# Patient Record
Sex: FEMALE | Race: WHITE | NOT HISPANIC OR LATINO | URBAN - METROPOLITAN AREA
[De-identification: names, ages, dates, MRNs, and addresses within clinical notes are randomized per-mention and may not be internally consistent; named-entity substitution may affect disease eponyms.]

---

## 2018-11-21 ENCOUNTER — APPOINTMENT (RX ONLY)
Dept: URBAN - METROPOLITAN AREA OTHER 4 | Facility: OTHER | Age: 28
Setting detail: DERMATOLOGY
End: 2018-11-21

## 2018-11-21 DIAGNOSIS — L21.8 OTHER SEBORRHEIC DERMATITIS: ICD-10-CM

## 2018-11-21 DIAGNOSIS — L259 CONTACT DERMATITIS AND OTHER ECZEMA, UNSPECIFIED CAUSE: ICD-10-CM

## 2018-11-21 PROBLEM — L23.9 ALLERGIC CONTACT DERMATITIS, UNSPECIFIED CAUSE: Status: ACTIVE | Noted: 2018-11-21

## 2018-11-21 PROCEDURE — ? PRESCRIPTION

## 2018-11-21 PROCEDURE — ? COUNSELING

## 2018-11-21 PROCEDURE — 99213 OFFICE O/P EST LOW 20 MIN: CPT

## 2018-11-21 RX ORDER — KETOCONAZOLE 20.5 MG/ML
SHAMPOO, SUSPENSION TOPICAL
Qty: 1 | Refills: 1 | Status: ERX | COMMUNITY
Start: 2018-11-21

## 2018-11-21 RX ORDER — HYDROCORTISONE 25 MG/G
OINTMENT TOPICAL
Qty: 1 | Refills: 0 | Status: ERX | COMMUNITY
Start: 2018-11-21

## 2018-11-21 RX ORDER — PREDNISONE 10 MG/1
TABLET ORAL
Qty: 18 | Refills: 0 | Status: ERX | COMMUNITY
Start: 2018-11-21

## 2018-11-21 RX ADMIN — PREDNISONE: 10 TABLET ORAL at 16:11

## 2018-11-21 RX ADMIN — KETOCONAZOLE: 20.5 SHAMPOO, SUSPENSION TOPICAL at 16:10

## 2018-11-21 RX ADMIN — HYDROCORTISONE: 25 OINTMENT TOPICAL at 16:10

## 2018-11-21 ASSESSMENT — SEVERITY ASSESSMENT
HOW SEVERE IS THIS PATIENT'S CONDITION?: MODERATE
SEVERITY: MILD TO MODERATE

## 2018-11-21 ASSESSMENT — LOCATION SIMPLE DESCRIPTION DERM
LOCATION SIMPLE: LEFT CHEEK
LOCATION SIMPLE: RIGHT CHEEK
LOCATION SIMPLE: RIGHT LIP
LOCATION SIMPLE: SCALP

## 2018-11-21 ASSESSMENT — LOCATION ZONE DERM
LOCATION ZONE: FACE
LOCATION ZONE: LIP
LOCATION ZONE: SCALP

## 2018-11-21 ASSESSMENT — LOCATION DETAILED DESCRIPTION DERM
LOCATION DETAILED: RIGHT LOWER CUTANEOUS LIP
LOCATION DETAILED: LEFT INFERIOR CENTRAL MALAR CHEEK
LOCATION DETAILED: RIGHT SUPERIOR PARIETAL SCALP
LOCATION DETAILED: RIGHT INFERIOR CENTRAL MALAR CHEEK

## 2018-11-21 ASSESSMENT — PAIN INTENSITY VAS: HOW INTENSE IS YOUR PAIN 0 BEING NO PAIN, 10 BEING THE MOST SEVERE PAIN POSSIBLE?: NO PAIN

## 2019-12-26 ENCOUNTER — APPOINTMENT (RX ONLY)
Dept: URBAN - METROPOLITAN AREA OTHER 9 | Facility: OTHER | Age: 29
Setting detail: DERMATOLOGY
End: 2019-12-26

## 2019-12-26 DIAGNOSIS — B07.8 OTHER VIRAL WARTS: ICD-10-CM

## 2019-12-26 DIAGNOSIS — L90.5 SCAR CONDITIONS AND FIBROSIS OF SKIN: ICD-10-CM

## 2019-12-26 DIAGNOSIS — L81.4 OTHER MELANIN HYPERPIGMENTATION: ICD-10-CM

## 2019-12-26 PROCEDURE — ? BENIGN DESTRUCTION

## 2019-12-26 PROCEDURE — ? PATIENT SPECIFIC COUNSELING

## 2019-12-26 PROCEDURE — 17110 DESTRUCTION B9 LES UP TO 14: CPT

## 2019-12-26 PROCEDURE — ? COUNSELING

## 2019-12-26 PROCEDURE — ? PRESCRIPTION

## 2019-12-26 PROCEDURE — ? TREATMENT REGIMEN

## 2019-12-26 PROCEDURE — 99213 OFFICE O/P EST LOW 20 MIN: CPT | Mod: 25

## 2019-12-26 RX ORDER — TRETINOIN 0.5 MG/G
LOTION TOPICAL QD
Qty: 1 | Refills: 0 | Status: ERX | COMMUNITY
Start: 2019-12-26

## 2019-12-26 RX ADMIN — TRETINOIN: 0.5 LOTION TOPICAL at 00:00

## 2019-12-26 ASSESSMENT — LOCATION SIMPLE DESCRIPTION DERM
LOCATION SIMPLE: LEFT CHEEK
LOCATION SIMPLE: RIGHT CHEEK
LOCATION SIMPLE: RIGHT INDEX FINGER

## 2019-12-26 ASSESSMENT — LOCATION DETAILED DESCRIPTION DERM
LOCATION DETAILED: RIGHT INDEX FINGERTIP
LOCATION DETAILED: LEFT INFERIOR CENTRAL MALAR CHEEK
LOCATION DETAILED: RIGHT INFERIOR CENTRAL MALAR CHEEK

## 2019-12-26 ASSESSMENT — LOCATION ZONE DERM
LOCATION ZONE: FINGER
LOCATION ZONE: FACE

## 2019-12-26 NOTE — PROCEDURE: PATIENT SPECIFIC COUNSELING
Detail Level: Simple
Holden instructed the patient to follow up in 1 month but patient stated she will be back in [place]. Due to distance Holden instructed the patient to keep a close eye on area for fragments and if able to feel free to come in for that 1 month follow up for further evaluation.

## 2019-12-26 NOTE — PROCEDURE: BENIGN DESTRUCTION
Medical Necessity Clause: This procedure was medically necessary because the lesions that were treated were:
Add 52 Modifier (Optional): no
Post-Care Instructions: I reviewed with the patient in detail post-care instructions. Patient is to wear sunprotection, and avoid picking at any of the treated lesions. Pt may apply Vaseline to crusted or scabbing areas.
Medical Necessity Information: It is in your best interest to select a reason for this procedure from the list below. All of these items fulfill various CMS LCD requirements except the new and changing color options.
Anesthesia Volume In Cc: 0.6
Render Post-Care Instructions In Note?: yes
Detail Level: Simple
Consent: The patient's consent was obtained including but not limited to risks of crusting, scabbing, blistering, scarring, darker or lighter pigmentary change, recurrence, incomplete removal and infection.
Treatment Number (Will Not Render If 0): 1

## 2020-12-28 ENCOUNTER — APPOINTMENT (RX ONLY)
Dept: URBAN - METROPOLITAN AREA CLINIC 12 | Facility: CLINIC | Age: 30
Setting detail: DERMATOLOGY
End: 2020-12-28

## 2020-12-28 DIAGNOSIS — Z41.9 ENCOUNTER FOR PROCEDURE FOR PURPOSES OTHER THAN REMEDYING HEALTH STATE, UNSPECIFIED: ICD-10-CM

## 2020-12-28 PROCEDURE — ? BOTOX

## 2020-12-28 NOTE — PROCEDURE: BOTOX
Map Statment: See attached map for complete details
Forehead Units: 5
Lot #: [id number]
Additional Area 1 Units: 0
Detail Level: Detailed
Administered By (Optional): BEA Link
Additional Area 1 Location: chin
Consent: Written consent was obtained prior to the procedure. Risks, benefits, expectations and alternatives were discussed including, but not limited to, infection, bleeding, lid/brow ptosis, bruising, swelling, diplopia, temporary effects, incomplete chemical denervation and dissatisfaction with the cosmetic outcome. No guarantee or warranty was given or implied regarding longevity of results.
Postcare Instructions: Patient instructed to not lie down for 4 hours and limit physical activity for 24 hours. Patient instructed not to travel by airplane for 48 hours.
Glabellar Complex Units: 15
Price Per Unit In $ (Use Numbers Only, No Text Please.): 12
Use Map Statement For Sites (Optional): No
Expiration Date (Month Year): 9/23
Periorbital Skin Units: 2.5
Bill Summary Price Listed Below, Or Bill Total Of Units X Price Per Unit?: Bill #Units x Price Per Unit
Summary Price $ (Use Numbers Only, No Text Please.): 270
Reconstitution Date: 12/28/20

## 2021-03-11 ENCOUNTER — APPOINTMENT (RX ONLY)
Dept: URBAN - METROPOLITAN AREA CLINIC 12 | Facility: CLINIC | Age: 31
Setting detail: DERMATOLOGY
End: 2021-03-11

## 2021-03-11 DIAGNOSIS — Z41.9 ENCOUNTER FOR PROCEDURE FOR PURPOSES OTHER THAN REMEDYING HEALTH STATE, UNSPECIFIED: ICD-10-CM

## 2021-03-11 PROCEDURE — ? BOTOX

## 2021-03-11 NOTE — PROCEDURE: BOTOX
Additional Area 1 Location: chin
Devin Units: 0
Bill Summary Price Listed Below, Or Bill Total Of Units X Price Per Unit?: Bill #Units x Price Per Unit
Postcare Instructions: Patient instructed to not lie down for 4 hours and limit physical activity for 24 hours. Patient instructed not to travel by airplane for 48 hours.
Dilution (U/0.1 Cc): 5
Use Map Statement For Sites (Optional): No
Administered By (Optional): BEA Link
Expiration Date (Month Year): 12/23
Periorbital Skin Units: 2.5
Map Statment: See attached map for complete details
Price Per Unit In $ (Use Numbers Only, No Text Please.): 14
Additional Area 2 Location: masseters
Detail Level: Detailed
Consent: Written consent was obtained prior to the procedure. Risks, benefits, expectations and alternatives were discussed including, but not limited to, infection, bleeding, lid/brow ptosis, bruising, swelling, diplopia, temporary effects, incomplete chemical denervation and dissatisfaction with the cosmetic outcome. No guarantee or warranty was given or implied regarding longevity of results.
Glabellar Complex Units: 20
Summary Price $ (Use Numbers Only, No Text Please.): 350
Lot #: [id number]
Reconstitution Date: 3/10/21

## 2021-05-06 ENCOUNTER — APPOINTMENT (RX ONLY)
Dept: URBAN - METROPOLITAN AREA CLINIC 12 | Facility: CLINIC | Age: 31
Setting detail: DERMATOLOGY
End: 2021-05-06

## 2021-05-06 DIAGNOSIS — Z41.9 ENCOUNTER FOR PROCEDURE FOR PURPOSES OTHER THAN REMEDYING HEALTH STATE, UNSPECIFIED: ICD-10-CM

## 2021-05-06 PROCEDURE — ? BOTOX

## 2021-05-06 NOTE — PROCEDURE: BOTOX
Lot #: [id number]
Perioral Units: 0
Expiration Date (Month Year): 7/23
Use Map Statement For Sites (Optional): No
Price Per Unit In $ (Use Numbers Only, No Text Please.): 14
Summary Price $ (Use Numbers Only, No Text Please.): 420
Bill Summary Price Listed Below, Or Bill Total Of Units X Price Per Unit?: Bill Summary Price Below
Detail Level: Detailed
Periorbital Skin Units: 5
Additional Area 2 Location: masseter
Reconstitution Date: 5/5/21
Additional Area 1 Location: chin
Map Statment: See attached map for complete details
Consent: Written consent was obtained prior to the procedure. Risks, benefits, expectations and alternatives were discussed including, but not limited to, infection, bleeding, lid/brow ptosis, bruising, swelling, diplopia, temporary effects, incomplete chemical denervation and dissatisfaction with the cosmetic outcome. No guarantee or warranty was given or implied regarding longevity of results.
Administered By (Optional): BEA Link
Show Price In Note?: yes
Forehead Units: 10
Glabellar Complex Units: 15
Postcare Instructions: Patient instructed to not lie down for 4 hours and limit physical activity for 24 hours. Patient instructed not to travel by airplane for 48 hours.

## 2021-06-29 ENCOUNTER — APPOINTMENT (RX ONLY)
Dept: URBAN - METROPOLITAN AREA CLINIC 12 | Facility: CLINIC | Age: 31
Setting detail: DERMATOLOGY
End: 2021-06-29

## 2021-06-29 DIAGNOSIS — Z41.9 ENCOUNTER FOR PROCEDURE FOR PURPOSES OTHER THAN REMEDYING HEALTH STATE, UNSPECIFIED: ICD-10-CM

## 2021-06-29 PROCEDURE — ? ADDITIONAL NOTES

## 2021-06-29 PROCEDURE — ? BOTOX

## 2021-06-29 PROCEDURE — ? CONSULTATION - FILLERS

## 2021-06-29 NOTE — PROCEDURE: ADDITIONAL NOTES
Render Risk Assessment In Note?: no
Additional Notes: Pt had previous  lips at [facility] in [place].. she thinks she got VOLBELLA and then vollure. Pt complains of lumps on upper lip and would like them dissolved.

## 2021-06-29 NOTE — PROCEDURE: CONSULTATION - FILLERS
Jawline Primary Filler Volume In Cc (Estimated): 0
Upper Lips Primary Filler Volume In Cc (Estimated): 0.5
Detail Level: Detailed
Send Procedure Quote As Charge: No
Lips Filler (Primary): Sylvester Ultra
Additional Area 1 Location: neck

## 2021-06-29 NOTE — PROCEDURE: BOTOX
Forehead Units: 5
Additional Area 2 Units: 0
Postcare Instructions: Patient instructed to not lie down for 4 hours and limit physical activity for 24 hours. Patient instructed not to travel by airplane for 48 hours.
Price Per Unit In $ (Use Numbers Only, No Text Please.): 14
Glabellar Complex Units: 2.5
Bill Summary Price Listed Below, Or Bill Total Of Units X Price Per Unit?: Bill Summary Price Below
Map Statment: See attached map for complete details
Expiration Date (Month Year): 9/23
Use Map Statement For Sites (Optional): No
Additional Area 2 Location: masseter
Additional Area 1 Location: chin
Consent: Written consent was obtained prior to the procedure. Risks, benefits, expectations and alternatives were discussed including, but not limited to, infection, bleeding, lid/brow ptosis, bruising, swelling, diplopia, temporary effects, incomplete chemical denervation and dissatisfaction with the cosmetic outcome. No guarantee or warranty was given or implied regarding longevity of results.
Administered By (Optional): BEA Link
Additional Area 3 Location: gummy smile
Detail Level: Detailed
Reconstitution Date: 6/22/21
Summary Price $ (Use Numbers Only, No Text Please.): 105
Show Price In Note?: yes
Lot #: [id number]

## 2021-07-27 ENCOUNTER — APPOINTMENT (RX ONLY)
Dept: URBAN - METROPOLITAN AREA CLINIC 12 | Facility: CLINIC | Age: 31
Setting detail: DERMATOLOGY
End: 2021-07-27

## 2021-07-27 DIAGNOSIS — Z41.9 ENCOUNTER FOR PROCEDURE FOR PURPOSES OTHER THAN REMEDYING HEALTH STATE, UNSPECIFIED: ICD-10-CM

## 2021-07-27 PROCEDURE — ? HYALURONIDASE INJECTION

## 2021-07-27 PROCEDURE — ? BOTOX

## 2021-07-27 ASSESSMENT — LOCATION DETAILED DESCRIPTION DERM
LOCATION DETAILED: LEFT SUPERIOR VERMILION LIP
LOCATION DETAILED: RIGHT SUPERIOR VERMILION LIP

## 2021-07-27 ASSESSMENT — LOCATION SIMPLE DESCRIPTION DERM
LOCATION SIMPLE: RIGHT LIP
LOCATION SIMPLE: LEFT LIP

## 2021-07-27 ASSESSMENT — LOCATION ZONE DERM: LOCATION ZONE: LIP

## 2021-07-27 NOTE — PROCEDURE: HYALURONIDASE INJECTION
Filler Previously Used (Optional): vollure
Total Volume (Ccs): 1
Notes: Pt had previously received filler from another injector in [place]. She complains of superficial bumps. She would like to have it dissolved.
Detail Level: Detailed
Consent: The risks of the procedure including pain, burning, contour defects and dimpling of the skin, and the need for multiple treatments were reviewed with the patient prior to the injection.
Lot # (Optional): [id number]
Hyaluronidase Preparation: hyaluronidase
Expiration Date (Optional): 4/21
Price (Use Numbers Only, No Special Characters Or $): 150

## 2021-07-27 NOTE — PROCEDURE: BOTOX
Expiration Date (Month Year): 10/23
Map Statment: See attached map for complete details
Nasal Root Units: 0
Consent: Written consent was obtained prior to the procedure. Risks, benefits, expectations and alternatives were discussed including, but not limited to, infection, bleeding, lid/brow ptosis, bruising, swelling, diplopia, temporary effects, incomplete chemical denervation and dissatisfaction with the cosmetic outcome. No guarantee or warranty was given or implied regarding longevity of results.
Detail Level: Detailed
Postcare Instructions: Patient instructed to not lie down for 4 hours and limit physical activity for 24 hours. Patient instructed not to travel by airplane for 48 hours.
Additional Area 2 Location: masseter
Lot #: [id number]
Additional Area 3 Location: gummy smile
Administered By (Optional): BEA Link
Periorbital Skin Units: 5
Reconstitution Date: 7/22/21
Show Price In Note?: yes
Use Map Statement For Sites (Optional): No
Price Per Unit In $ (Use Numbers Only, No Text Please.): 14
Bill Summary Price Listed Below, Or Bill Total Of Units X Price Per Unit?: Bill Summary Price Below
Glabellar Complex Units: 15
Summary Price $ (Use Numbers Only, No Text Please.): 350
Additional Area 1 Location: chin

## 2021-08-10 ENCOUNTER — APPOINTMENT (RX ONLY)
Dept: URBAN - METROPOLITAN AREA CLINIC 12 | Facility: CLINIC | Age: 31
Setting detail: DERMATOLOGY
End: 2021-08-10

## 2021-08-10 DIAGNOSIS — Z41.9 ENCOUNTER FOR PROCEDURE FOR PURPOSES OTHER THAN REMEDYING HEALTH STATE, UNSPECIFIED: ICD-10-CM

## 2021-08-10 PROCEDURE — ? CONSULTATION - FILLERS

## 2021-08-10 PROCEDURE — ? RESTYLANE KYSSE INJECTION

## 2021-08-10 NOTE — PROCEDURE: CONSULTATION - FILLERS
Additional Area 3 Secondary Filler Volume In Cc (Estimated): 0
Jawline Filler (Primary): Teosyal RHA 4
Detail Level: Detailed
Jawline Primary Filler Volume In Cc (Estimated): 1
Additional Area 1 Location: neck
Send Procedure Quote As Charge: No
Malar Filler (Primary): Juvederm Voluma XC
Malar Primary Filler Volume In Cc (Estimated): 2

## 2021-08-10 NOTE — PROCEDURE: RESTYLANE KYSSE INJECTION
Lateral Face Filler Volume In Cc: 0
Additional Anesthesia Volume In Cc: 6
Expiration Date (Month Year): 9/31/22
Include Cannula Information In Note?: No
Detail Level: Detailed
Number Of Syringes (Required For Inventory): 1
Anesthesia Volume In Cc: 0.5
Post-Care Instructions: Patient instructed to apply ice to reduce swelling.
Price (Use Numbers Only, No Special Characters Or $): 336
Procedural Text: The filler was administered to the treatment areas noted above.
Map Statement: See Attach Map for Complete Details
Anesthesia Type: 1% lidocaine with epinephrine
Filler: Restylane Kysse
Consent: Written consent obtained. Risks include but not limited to bruising, beading, irregular texture, ulceration, infection, allergic reaction, scar formation, incomplete augmentation, temporary nature, procedural pain.
Lot #: [id number]

## 2021-08-31 ENCOUNTER — APPOINTMENT (RX ONLY)
Dept: URBAN - METROPOLITAN AREA CLINIC 12 | Facility: CLINIC | Age: 31
Setting detail: DERMATOLOGY
End: 2021-08-31

## 2021-08-31 DIAGNOSIS — Z41.9 ENCOUNTER FOR PROCEDURE FOR PURPOSES OTHER THAN REMEDYING HEALTH STATE, UNSPECIFIED: ICD-10-CM

## 2021-08-31 PROCEDURE — ? RESTYLANE KYSSE INJECTION

## 2021-08-31 PROCEDURE — ? BOTOX

## 2021-08-31 NOTE — PROCEDURE: BOTOX
Additional Area 1 Units: 0
Reconstitution Date: 8/26/21
Lot #: [id number]
Show Price In Note?: yes
Forehead Units: 5
Summary Price $ (Use Numbers Only, No Text Please.): 03
Map Statment: See attached map for complete details
Detail Level: Detailed
Bill Summary Price Listed Below, Or Bill Total Of Units X Price Per Unit?: Bill Summary Price Below
Postcare Instructions: Patient instructed to not lie down for 4 hours and limit physical activity for 24 hours. Patient instructed not to travel by airplane for 48 hours.
Use Map Statement For Sites (Optional): No
Additional Area 1 Location: chin
Expiration Date (Month Year): 11/24
Consent: Written consent was obtained prior to the procedure. Risks, benefits, expectations and alternatives were discussed including, but not limited to, infection, bleeding, lid/brow ptosis, bruising, swelling, diplopia, temporary effects, incomplete chemical denervation and dissatisfaction with the cosmetic outcome. No guarantee or warranty was given or implied regarding longevity of results.
Price Per Unit In $ (Use Numbers Only, No Text Please.): 14
Additional Area 2 Location: masseter
Additional Area 3 Location: gummy smile
Administered By (Optional): BEA Link

## 2021-08-31 NOTE — PROCEDURE: RESTYLANE KYSSE INJECTION
Cheeks Filler Volume In Cc: 0
Lot #: [id number]
Anesthesia Volume In Cc: 0.5
Additional Anesthesia Volume In Cc: 6
Number Of Syringes (Required For Inventory): 1
Procedural Text: The filler was administered to the treatment areas noted above.
Post-Care Instructions: Patient instructed to apply ice to reduce swelling.
Map Statement: See Attach Map for Complete Details
Detail Level: Detailed
Anesthesia Type: 1% lidocaine with epinephrine
Consent: Written consent obtained. Risks include but not limited to bruising, beading, irregular texture, ulceration, infection, allergic reaction, scar formation, incomplete augmentation, temporary nature, procedural pain.
Include Cannula Information In Note?: No
Expiration Date (Month Year): 9/30/22
Filler: Restylane Kysse

## 2021-09-14 ENCOUNTER — APPOINTMENT (RX ONLY)
Dept: URBAN - METROPOLITAN AREA CLINIC 12 | Facility: CLINIC | Age: 31
Setting detail: DERMATOLOGY
End: 2021-09-14

## 2021-09-14 DIAGNOSIS — Z428 OTHER AFTERCARE INVOLVING THE USE OF PLASTIC SURGERY: ICD-10-CM

## 2021-09-14 DIAGNOSIS — Z41.9 ENCOUNTER FOR PROCEDURE FOR PURPOSES OTHER THAN REMEDYING HEALTH STATE, UNSPECIFIED: ICD-10-CM

## 2021-09-14 PROCEDURE — ? CONSULTATION - FILLERS

## 2021-09-14 PROCEDURE — ? BOTOX

## 2021-09-14 NOTE — PROCEDURE: BOTOX
Devin Units: 0
Consent: Written consent was obtained prior to the procedure. Risks, benefits, expectations and alternatives were discussed including, but not limited to, infection, bleeding, lid/brow ptosis, bruising, swelling, diplopia, temporary effects, incomplete chemical denervation and dissatisfaction with the cosmetic outcome. No guarantee or warranty was given or implied regarding longevity of results.
Postcare Instructions: Patient instructed to not lie down for 4 hours and limit physical activity for 24 hours. Patient instructed not to travel by airplane for 48 hours.
Use Map Statement For Sites (Optional): No
Lot #: [id number]
Bill Summary Price Listed Below, Or Bill Total Of Units X Price Per Unit?: Bill Summary Price Below
Additional Area 1 Location: chin
Price Per Unit In $ (Use Numbers Only, No Text Please.): 14
Additional Area 2 Location: masseter
Map Statment: See attached map for complete details
Additional Area 3 Units: 5
Additional Area 3 Location: gummy smile
Expiration Date (Month Year): 11/23
Administered By (Optional): BEA Link
Show Price In Note?: yes
Detail Level: Detailed

## 2021-09-14 NOTE — PROCEDURE: CONSULTATION - FILLERS
Jawline Secondary Filler Volume In Cc (Estimated): 0
Jawline Filler (Primary): Juvederm Voluma XC
Jawline Primary Filler Volume In Cc (Estimated): 1
Additional Area 1 Location: chin
Detail Level: Detailed
Send Procedure Quote As Charge: No

## 2021-09-29 ENCOUNTER — APPOINTMENT (RX ONLY)
Dept: URBAN - METROPOLITAN AREA CLINIC 12 | Facility: CLINIC | Age: 31
Setting detail: DERMATOLOGY
End: 2021-09-29

## 2021-09-29 DIAGNOSIS — Z41.9 ENCOUNTER FOR PROCEDURE FOR PURPOSES OTHER THAN REMEDYING HEALTH STATE, UNSPECIFIED: ICD-10-CM

## 2021-09-29 PROCEDURE — ? BOTOX

## 2021-09-29 NOTE — PROCEDURE: BOTOX
Use Map Statement For Sites (Optional): No
Forehead Units: 5
Show Price In Note?: yes
Summary Price $ (Use Numbers Only, No Text Please.): 325
Additional Area 1 Location: chin
Detail Level: Detailed
Additional Area 2 Units: 0
Reconstitution Date: 9/29/21
Map Statment: See attached map for complete details
Bill Summary Price Listed Below, Or Bill Total Of Units X Price Per Unit?: Bill Summary Price Below
Postcare Instructions: Patient instructed to not lie down for 4 hours and limit physical activity for 24 hours. Patient instructed not to travel by airplane for 48 hours.
Price Per Unit In $ (Use Numbers Only, No Text Please.): 14
Additional Area 2 Location: masseter
Additional Area 3 Units: 2.5
Periorbital Skin Units: 10
Consent: Written consent was obtained prior to the procedure. Risks, benefits, expectations and alternatives were discussed including, but not limited to, infection, bleeding, lid/brow ptosis, bruising, swelling, diplopia, temporary effects, incomplete chemical denervation and dissatisfaction with the cosmetic outcome. No guarantee or warranty was given or implied regarding longevity of results.
Additional Area 3 Location: gummy smile
Administered By (Optional): BEA Link
Expiration Date (Month Year): 11/23
Lot #: [id number]
Glabellar Complex Units: 15

## 2021-10-12 ENCOUNTER — APPOINTMENT (RX ONLY)
Dept: URBAN - METROPOLITAN AREA CLINIC 12 | Facility: CLINIC | Age: 31
Setting detail: DERMATOLOGY
End: 2021-10-12

## 2021-10-12 DIAGNOSIS — Z41.9 ENCOUNTER FOR PROCEDURE FOR PURPOSES OTHER THAN REMEDYING HEALTH STATE, UNSPECIFIED: ICD-10-CM

## 2021-10-12 PROCEDURE — ? JUVEDERM ULTRA PLUS INJECTION

## 2021-10-12 PROCEDURE — ? JUVEDERM VOLUMA XC INJECTION

## 2021-10-12 NOTE — PROCEDURE: JUVEDERM ULTRA PLUS INJECTION
Additional Anesthesia Volume In Cc: 6
Procedural Text: The filler was administered to the treatment areas noted above.
Expiration Date (Month Year): 6/8/22
Lateral Face Filler Volume In Cc: 0
Price (Use Numbers Only, No Special Characters Or $): 395
Additional Area 1 Location: lips
Anesthesia Volume In Cc: 0.5
Vermilion Lips Filler Volume In Cc: 0.2
Consent: Written consent obtained. Risks include but not limited to bruising, beading, irregular texture, ulceration, infection, allergic reaction, scar formation, incomplete augmentation, temporary nature, procedural pain.
Map Statment: See Attach Map for Complete Details
Include Cannula Information In Note?: No
Number Of Syringes (Required For Inventory): 1
Detail Level: Detailed
Post-Care Instructions: Patient instructed to apply ice to reduce swelling.
Lot #: [id number]
Additional Area 2 Location: chin
Anesthesia Type: 1% lidocaine with epinephrine
Filler: Juvederm Ultra Plus

## 2021-10-12 NOTE — PROCEDURE: JUVEDERM VOLUMA XC INJECTION
Marionette Lines Filler Volume In Cc: 0
Post-Care Instructions: Patient instructed to apply ice to reduce swelling.
Cheeks Filler Volume In Cc: 1
Price (Use Numbers Only, No Special Characters Or $): 1800
Lot #: [id number]
Map Statment: See Attach Map for Complete Details
Filler: Juvederm Voluma XC
Anesthesia Type: 1% lidocaine with epinephrine
Detail Level: Detailed
Procedural Text: The filler was administered to the treatment areas noted above.
Use Map Statement For Sites (Optional): No
Expiration Date (Month Year): 10/27/22
Additional Anesthesia Volume In Cc: 6
Consent: Written consent obtained. Risks include but not limited to bruising, beading, irregular texture, ulceration, infection, allergic reaction, scar formation, incomplete augmentation, temporary nature, procedural pain.
Additional Area 1 Location: chin
Number Of Syringes (Required For Inventory): 2
Anesthesia Volume In Cc: 0.5

## 2021-10-26 ENCOUNTER — APPOINTMENT (RX ONLY)
Dept: URBAN - METROPOLITAN AREA CLINIC 12 | Facility: CLINIC | Age: 31
Setting detail: DERMATOLOGY
End: 2021-10-26

## 2021-10-26 DIAGNOSIS — Z41.9 ENCOUNTER FOR PROCEDURE FOR PURPOSES OTHER THAN REMEDYING HEALTH STATE, UNSPECIFIED: ICD-10-CM

## 2021-10-26 DIAGNOSIS — Z428 OTHER AFTERCARE INVOLVING THE USE OF PLASTIC SURGERY: ICD-10-CM

## 2021-10-26 PROCEDURE — ? CONSULTATION - FILLERS

## 2021-10-26 NOTE — PROCEDURE: CONSULTATION - FILLERS
Marionette Lines Primary Filler Volume In Cc (Estimated): 0
Upper Lips Primary Filler Volume In Cc (Estimated): 0.5
Send Procedure Quote As Charge: No
Detail Level: Detailed
Additional Area 2 Location: chin
Additional Area 1 Location: neck
Lips Filler (Primary): Juvederm Volbella XC

## 2021-11-11 ENCOUNTER — APPOINTMENT (RX ONLY)
Dept: URBAN - METROPOLITAN AREA CLINIC 12 | Facility: CLINIC | Age: 31
Setting detail: DERMATOLOGY
End: 2021-11-11

## 2021-11-11 DIAGNOSIS — Z41.9 ENCOUNTER FOR PROCEDURE FOR PURPOSES OTHER THAN REMEDYING HEALTH STATE, UNSPECIFIED: ICD-10-CM

## 2021-11-11 PROCEDURE — ? JUVEDERM ULTRA PLUS INJECTION

## 2021-11-11 PROCEDURE — ? RESTYLANE KYSSE INJECTION

## 2021-11-11 NOTE — PROCEDURE: RESTYLANE KYSSE INJECTION
Post-Care Instructions: Patient instructed to apply ice to reduce swelling.
Mid Face Filler Volume In Cc: 0
Vermilion Lips Filler Volume In Cc: 0.5
Anesthesia Type: 1% lidocaine with epinephrine
Filler: Restylane Kysse
Additional Anesthesia Volume In Cc: 6
Procedural Text: The filler was administered to the treatment areas noted above.
Detail Level: Detailed
Use Map Statement For Sites (Optional): No
Number Of Syringes (Required For Inventory): 1
Map Statement: See Attach Map for Complete Details
Lot #: [id number]
Expiration Date (Month Year): 5/31/23
Consent: Written consent obtained. Risks include but not limited to bruising, beading, irregular texture, ulceration, infection, allergic reaction, scar formation, incomplete augmentation, temporary nature, procedural pain.
Price (Use Numbers Only, No Special Characters Or $): 494

## 2021-11-11 NOTE — PROCEDURE: JUVEDERM ULTRA PLUS INJECTION
Cheeks Filler Volume In Cc: 0
Procedural Text: The filler was administered to the treatment areas noted above.
Expiration Date (Month Year): 6/8/22
Post-Care Instructions: Patient instructed to apply ice to reduce swelling.
Number Of Syringes (Required For Inventory): 1
Vermilion Lips Filler Volume In Cc: 0.3
Additional Area 1 Location: lips
Use Map Statement For Sites (Optional): No
Anesthesia Volume In Cc: 0.5
Consent: Written consent obtained. Risks include but not limited to bruising, beading, irregular texture, ulceration, infection, allergic reaction, scar formation, incomplete augmentation, temporary nature, procedural pain.
Additional Anesthesia Volume In Cc: 6
Detail Level: Detailed
Lot #: [id number]
Additional Area 2 Location: chin
Map Statment: See Attach Map for Complete Details
Filler: Juvederm Ultra Plus
Anesthesia Type: 1% lidocaine with epinephrine

## 2021-12-01 ENCOUNTER — APPOINTMENT (RX ONLY)
Dept: URBAN - METROPOLITAN AREA CLINIC 12 | Facility: CLINIC | Age: 31
Setting detail: DERMATOLOGY
End: 2021-12-01

## 2021-12-01 DIAGNOSIS — Z41.9 ENCOUNTER FOR PROCEDURE FOR PURPOSES OTHER THAN REMEDYING HEALTH STATE, UNSPECIFIED: ICD-10-CM

## 2021-12-01 DIAGNOSIS — Z428 OTHER AFTERCARE INVOLVING THE USE OF PLASTIC SURGERY: ICD-10-CM

## 2021-12-01 PROCEDURE — ? BOTOX

## 2021-12-01 NOTE — PROCEDURE: BOTOX
Postcare Instructions: Patient instructed to not lie down for 4 hours and limit physical activity for 24 hours. Patient instructed not to travel by airplane for 48 hours.
Price Per Unit In $ (Use Numbers Only, No Text Please.): 14
Use Map Statement For Sites (Optional): No
Forehead Units: 10
Glabellar Complex Units: 15
Platsyma Units: 0
Additional Area 2 Location: masseter
Detail Level: Detailed
Bill Summary Price Listed Below, Or Bill Total Of Units X Price Per Unit?: Bill Summary Price Below
Map Statment: See attached map for complete details
Dilution (U/0.1 Cc): 5
Summary Price $ (Use Numbers Only, No Text Please.): 372.5
Administered By (Optional): BEA Link
Show Price In Note?: yes
Additional Area 1 Location: chin
Lot #: [id number]
Additional Area 3 Location: gummy smile
Consent: Written consent was obtained prior to the procedure. Risks, benefits, expectations and alternatives were discussed including, but not limited to, infection, bleeding, lid/brow ptosis, bruising, swelling, diplopia, temporary effects, incomplete chemical denervation and dissatisfaction with the cosmetic outcome. No guarantee or warranty was given or implied regarding longevity of results.

## 2021-12-15 ENCOUNTER — APPOINTMENT (RX ONLY)
Dept: URBAN - METROPOLITAN AREA CLINIC 12 | Facility: CLINIC | Age: 31
Setting detail: DERMATOLOGY
End: 2021-12-15

## 2021-12-15 DIAGNOSIS — Z42.8 ENCOUNTER FOR OTHER PLASTIC AND RECONSTRUCTIVE SURGERY FOLLOWING MEDICAL PROCEDURE OR HEALED INJURY: ICD-10-CM

## 2021-12-15 DIAGNOSIS — Z41.9 ENCOUNTER FOR PROCEDURE FOR PURPOSES OTHER THAN REMEDYING HEALTH STATE, UNSPECIFIED: ICD-10-CM

## 2021-12-15 PROCEDURE — ? BOTOX

## 2021-12-15 NOTE — PROCEDURE: BOTOX
Additional Area 2 Units: 0
Show Price In Note?: yes
Administered By (Optional): BEA Link
Detail Level: Detailed
Use Map Statement For Sites (Optional): No
Postcare Instructions: Patient instructed to not lie down for 4 hours and limit physical activity for 24 hours. Patient instructed not to travel by airplane for 48 hours.
Consent: Written consent was obtained prior to the procedure. Risks, benefits, expectations and alternatives were discussed including, but not limited to, infection, bleeding, lid/brow ptosis, bruising, swelling, diplopia, temporary effects, incomplete chemical denervation and dissatisfaction with the cosmetic outcome. No guarantee or warranty was given or implied regarding longevity of results.
Additional Area 1 Location: chin
Additional Area 3 Units: 7.5
Dilution (U/0.1 Cc): 5
Bill Summary Price Listed Below, Or Bill Total Of Units X Price Per Unit?: Bill Summary Price Below
Price Per Unit In $ (Use Numbers Only, No Text Please.): 14
Additional Area 2 Location: masseter
Expiration Date (Month Year): 03/2024
Map Statment: See attached map for complete details
Additional Area 3 Location: gummy smile
Summary Price $ (Use Numbers Only, No Text Please.): 90
Lot #: [id number]

## 2022-01-05 ENCOUNTER — APPOINTMENT (RX ONLY)
Dept: URBAN - METROPOLITAN AREA CLINIC 12 | Facility: CLINIC | Age: 32
Setting detail: DERMATOLOGY
End: 2022-01-05

## 2022-01-05 DIAGNOSIS — Z41.9 ENCOUNTER FOR PROCEDURE FOR PURPOSES OTHER THAN REMEDYING HEALTH STATE, UNSPECIFIED: ICD-10-CM

## 2022-01-05 PROCEDURE — ? BOTOX

## 2022-01-05 PROCEDURE — ? RESTYLANE KYSSE INJECTION

## 2022-01-05 NOTE — PROCEDURE: RESTYLANE KYSSE INJECTION
Anesthesia Volume In Cc: 0.5
Additional Anesthesia Volume In Cc: 6
Decollete Filler Volume In Cc: 0
Consent: Written consent obtained. Risks include but not limited to bruising, beading, irregular texture, ulceration, infection, allergic reaction, scar formation, incomplete augmentation, temporary nature, procedural pain.
Use Map Statement For Sites (Optional): No
Number Of Syringes (Required For Inventory): 1
Lot #: [id number]
Procedural Text: The filler was administered to the treatment areas noted above.
Post-Care Instructions: Patient instructed to apply ice to reduce swelling.
Map Statement: See Attach Map for Complete Details
Vermilion Lips Filler Volume In Cc: 0.2
Expiration Date (Month Year): 5/31/23
Detail Level: Detailed
Filler: Restylane Kysse

## 2022-01-05 NOTE — PROCEDURE: BOTOX
Glabellar Complex Units: 0
Map Statment: See attached map for complete details
Forehead Units: 0.5
Additional Area 3 Location: gummy smile
Additional Area 1 Location: chin
Lot #: [id number]
Price Per Unit In $ (Use Numbers Only, No Text Please.): 14
Expiration Date (Month Year): 3/24
Detail Level: Detailed
Use Map Statement For Sites (Optional): No
Postcare Instructions: Patient instructed to not lie down for 4 hours and limit physical activity for 24 hours. Patient instructed not to travel by airplane for 48 hours.
Consent: Written consent was obtained prior to the procedure. Risks, benefits, expectations and alternatives were discussed including, but not limited to, infection, bleeding, lid/brow ptosis, bruising, swelling, diplopia, temporary effects, incomplete chemical denervation and dissatisfaction with the cosmetic outcome. No guarantee or warranty was given or implied regarding longevity of results.
Dilution (U/0.1 Cc): 5
Show Price In Note?: yes
Bill Summary Price Listed Below, Or Bill Total Of Units X Price Per Unit?: Bill Summary Price Below
Administered By (Optional): BEA Link
Additional Area 2 Location: masseter/temples

## 2022-02-22 ENCOUNTER — APPOINTMENT (RX ONLY)
Dept: URBAN - METROPOLITAN AREA CLINIC 12 | Facility: CLINIC | Age: 32
Setting detail: DERMATOLOGY
End: 2022-02-22

## 2022-02-22 DIAGNOSIS — Z41.9 ENCOUNTER FOR PROCEDURE FOR PURPOSES OTHER THAN REMEDYING HEALTH STATE, UNSPECIFIED: ICD-10-CM

## 2022-02-22 PROCEDURE — ? CONSULTATION - FILLERS

## 2022-02-22 PROCEDURE — ? RESTYLANE KYSSE INJECTION

## 2022-02-22 PROCEDURE — ? BOTOX

## 2022-02-22 NOTE — PROCEDURE: RESTYLANE KYSSE INJECTION
Post-Care Instructions: Patient instructed to apply ice to reduce swelling.
Expiration Date (Month Year): 5/31/23
Lateral Face Filler Volume In Cc: 0
Additional Anesthesia Volume In Cc: 6
Number Of Syringes (Required For Inventory): 1
Map Statement: See Attach Map for Complete Details
Procedural Text: The filler was administered to the treatment areas noted above.
Anesthesia Volume In Cc: 0.5
Lot #: [id number]
Detail Level: Detailed
Consent: Written consent obtained. Risks include but not limited to bruising, beading, irregular texture, ulceration, infection, allergic reaction, scar formation, incomplete augmentation, temporary nature, procedural pain.
Use Map Statement For Sites (Optional): No
Vermilion Lips Filler Volume In Cc: 0.3
Filler: Restylane Kysse

## 2022-02-22 NOTE — PROCEDURE: BOTOX
Postcare Instructions: Patient instructed to not lie down for 4 hours and limit physical activity for 24 hours. Patient instructed not to travel by airplane for 48 hours.
Price Per Unit In $ (Use Numbers Only, No Text Please.): 14
Use Map Statement For Sites (Optional): No
Forehead Units: 10
Glabellar Complex Units: 15
Platsyma Units: 0
Additional Area 2 Location: masseter
Additional Area 3 Units: 7.5
Detail Level: Detailed
Expiration Date (Month Year): 4/24
Bill Summary Price Listed Below, Or Bill Total Of Units X Price Per Unit?: Bill Summary Price Below
Map Statment: See attached map for complete details
Dilution (U/0.1 Cc): 5
Summary Price $ (Use Numbers Only, No Text Please.): 595
Administered By (Optional): BEA Link
Show Price In Note?: yes
Additional Area 1 Location: chin
Lot #: [id number]
Additional Area 3 Location: gummy smile
Consent: Written consent was obtained prior to the procedure. Risks, benefits, expectations and alternatives were discussed including, but not limited to, infection, bleeding, lid/brow ptosis, bruising, swelling, diplopia, temporary effects, incomplete chemical denervation and dissatisfaction with the cosmetic outcome. No guarantee or warranty was given or implied regarding longevity of results.

## 2022-02-22 NOTE — PROCEDURE: CONSULTATION - FILLERS
Additional Area 4 Primary Filler Volume In Cc (Estimated): 0
Additional Area 2 Location: submentum
Send Procedure Quote As Charge: No
Jawline Filler (Primary): Juvederm Voluma XC
Additional Area 1 Location: neck
Jawline Primary Filler Volume In Cc (Estimated): 1
Detail Level: Detailed

## 2022-02-24 ENCOUNTER — APPOINTMENT (RX ONLY)
Dept: URBAN - METROPOLITAN AREA OTHER 9 | Facility: OTHER | Age: 32
Setting detail: DERMATOLOGY
End: 2022-02-24

## 2022-02-24 DIAGNOSIS — D22 MELANOCYTIC NEVI: ICD-10-CM

## 2022-02-24 DIAGNOSIS — D485 NEOPLASM OF UNCERTAIN BEHAVIOR OF SKIN: ICD-10-CM

## 2022-02-24 PROBLEM — D48.5 NEOPLASM OF UNCERTAIN BEHAVIOR OF SKIN: Status: ACTIVE | Noted: 2022-02-24

## 2022-02-24 PROBLEM — D22.5 MELANOCYTIC NEVI OF TRUNK: Status: ACTIVE | Noted: 2022-02-24

## 2022-02-24 PROCEDURE — ? COUNSELING

## 2022-02-24 PROCEDURE — 99212 OFFICE O/P EST SF 10 MIN: CPT | Mod: 25

## 2022-02-24 PROCEDURE — ? BIOPSY BY SHAVE METHOD

## 2022-02-24 ASSESSMENT — LOCATION DETAILED DESCRIPTION DERM
LOCATION DETAILED: LEFT SUPERIOR UPPER BACK
LOCATION DETAILED: INFERIOR THORACIC SPINE

## 2022-02-24 ASSESSMENT — LOCATION SIMPLE DESCRIPTION DERM
LOCATION SIMPLE: UPPER BACK
LOCATION SIMPLE: LEFT UPPER BACK

## 2022-02-24 ASSESSMENT — LOCATION ZONE DERM: LOCATION ZONE: TRUNK

## 2022-02-24 NOTE — PROCEDURE: BIOPSY BY SHAVE METHOD
Detail Level: Detailed
Depth Of Biopsy: dermis
Was A Bandage Applied: Yes
Size Of Lesion In Cm: 0.5
X Size Of Lesion In Cm: 1
Biopsy Type: H and E
Biopsy Method: Personna blade
Anesthesia Type: 1% lidocaine with 1:200,000 epinephrine
Anesthesia Volume In Cc: 3
Additional Anesthesia Volume In Cc (Will Not Render If 0): 0
Hemostasis: Aluminum Chloride and Thermocautery
Wound Care: Petrolatum
Dressing: bandage
Destruction After The Procedure: No
Type Of Destruction Used: Curettage
Curettage Text: The wound bed was treated with curettage after the biopsy was performed
Cryotherapy Text: The wound bed was treated with cryotherapy after the biopsy was performed.
Electrodesiccation Text: The wound bed was treated with electrodesiccation after the biopsy was performed.
Electrodesiccation And Curettage Text: The wound bed was treated with electrodesiccation and curettage after the biopsy was performed.
Silver Nitrate Text: The wound bed was treated with silver nitrate after the biopsy was performed.
Lab: [id number]
Lab Facility: [id number]
Consent: Written consent was obtained and risks were reviewed including but not limited to scarring, infection, bleeding, scabbing, incomplete removal, nerve damage and allergy to anesthesia.
Post-Care Instructions: I reviewed with the patient in detail post-care instructions. Patient is to keep the biopsy site dry overnight, and then apply bacitracin twice daily until healed. Patient may apply hydrogen peroxide soaks to remove any crusting.
Notification Instructions: Patient will be notified of biopsy results. However, patient instructed to call the office if not contacted within 2 weeks.
Billing Type: Third-Party Bill
Information: Selecting Yes will display possible errors in your note based on the variables you have selected. This validation is only offered as a suggestion for you. PLEASE NOTE THAT THE VALIDATION TEXT WILL BE REMOVED WHEN YOU FINALIZE YOUR NOTE. IF YOU WANT TO FAX A PRELIMINARY NOTE YOU WILL NEED TO TOGGLE THIS TO 'NO' IF YOU DO NOT WANT IT IN YOUR FAXED NOTE.

## 2022-03-28 ENCOUNTER — APPOINTMENT (RX ONLY)
Dept: URBAN - METROPOLITAN AREA CLINIC 12 | Facility: CLINIC | Age: 32
Setting detail: DERMATOLOGY
End: 2022-03-28

## 2022-03-28 ENCOUNTER — APPOINTMENT (RX ONLY)
Dept: URBAN - METROPOLITAN AREA OTHER 9 | Facility: OTHER | Age: 32
Setting detail: DERMATOLOGY
End: 2022-03-28

## 2022-03-28 DIAGNOSIS — Z41.9 ENCOUNTER FOR PROCEDURE FOR PURPOSES OTHER THAN REMEDYING HEALTH STATE, UNSPECIFIED: ICD-10-CM

## 2022-03-28 PROBLEM — C44.519 BASAL CELL CARCINOMA OF SKIN OF OTHER PART OF TRUNK: Status: ACTIVE | Noted: 2022-03-28

## 2022-03-28 PROCEDURE — 17262 DSTRJ MAL LES T/A/L 1.1-2.0: CPT

## 2022-03-28 PROCEDURE — ? BOTOX

## 2022-03-28 PROCEDURE — ? CURETTAGE AND DESTRUCTION

## 2022-03-28 NOTE — PROCEDURE: BOTOX
Map Statment: See attached map for complete details
Use Map Statement For Sites (Optional): No
Additional Area 1 Location: chin
Postcare Instructions: Patient instructed to not lie down for 4 hours and limit physical activity for 24 hours. Patient instructed not to travel by airplane for 48 hours.
Summary Price $ (Use Numbers Only, No Text Please.): 200
Price Per Unit In $ (Use Numbers Only, No Text Please.): 15
Bill Summary Price Listed Below, Or Bill Total Of Units X Price Per Unit?: Bill Summary Price Below
Devin Units: 0
Show Price In Note?: yes
Lot #: [id number]
Forehead Units: 10
Additional Area 3 Location: axillae
Expiration Date (Month Year): 5/24
Additional Area 2 Location: masseter
Dilution (U/0.1 Cc): 5
Consent: Written consent was obtained prior to the procedure. Risks, benefits, expectations and alternatives were discussed including, but not limited to, infection, bleeding, lid/brow ptosis, bruising, swelling, diplopia, temporary effects, incomplete chemical denervation and dissatisfaction with the cosmetic outcome. No guarantee or warranty was given or implied regarding longevity of results.
Administered By (Optional): BEA Link
Detail Level: Simple

## 2022-03-28 NOTE — PROCEDURE: CURETTAGE AND DESTRUCTION
Body Location Override (Optional - Billing Will Still Be Based On Selected Body Map Location If Applicable): inferior thoracic spine
Detail Level: Detailed
Number Of Curettages: 3
Size Of Lesion In Cm: 1
Size Of Lesion After Curettage: 1.3
Add Intralesional Injection: No
Anesthesia Type: 0.25% bupivacaine with 1:200,000 epinephrine
Cautery Type: electrodesiccation
What Was Performed First?: Curettage
Final Size Statement: The size of the lesion after curettage was
Additional Information: (Optional): The wound was cleaned, and a pressure dressing was applied with Mupirocin. The patient received detailed post-op instructions. The patient experienced a brief vasovagal response to the procedure.
Consent was obtained from the patient. The risks, benefits and alternatives to therapy were discussed in detail. Specifically, the risks of infection, scarring, bleeding, prolonged wound healing, nerve injury, incomplete removal, allergy to anesthesia and recurrence were addressed. Alternatives to ED&C, such as: surgical removal and XRT were also discussed.  Prior to the procedure, the treatment site was clearly identified and confirmed by the patient. All components of Universal Protocol/PAUSE Rule completed.
Post-Care Instructions: I reviewed with the patient in detail post-care instructions. Patient is to keep the area dry for 48 hours, and not to engage in any swimming until the area is healed. Should the patient develop any fevers, chills, bleeding, severe pain patient will contact the office immediately.
Bill As A Line Item Or As Units: Line Item

## 2022-04-27 ENCOUNTER — APPOINTMENT (RX ONLY)
Dept: URBAN - METROPOLITAN AREA CLINIC 12 | Facility: CLINIC | Age: 32
Setting detail: DERMATOLOGY
End: 2022-04-27

## 2022-04-27 DIAGNOSIS — Z41.9 ENCOUNTER FOR PROCEDURE FOR PURPOSES OTHER THAN REMEDYING HEALTH STATE, UNSPECIFIED: ICD-10-CM

## 2022-04-27 PROCEDURE — ? BOTOX

## 2022-04-27 NOTE — PROCEDURE: BOTOX
Additional Area 1 Location: chin
Glabellar Complex Units: 0
Price Per Unit In $ (Use Numbers Only, No Text Please.): 15
Lot #: [id number]
Forehead Units: 5
Administered By (Optional): BEA Link
Postcare Instructions: Patient instructed to not lie down for 4 hours and limit physical activity for 24 hours. Patient instructed not to travel by airplane for 48 hours.
Show Price In Note?: yes
Expiration Date (Month Year): 5/24
Additional Area 2 Location: masseter
Use Map Statement For Sites (Optional): No
Consent: Written consent was obtained prior to the procedure. Risks, benefits, expectations and alternatives were discussed including, but not limited to, infection, bleeding, lid/brow ptosis, bruising, swelling, diplopia, temporary effects, incomplete chemical denervation and dissatisfaction with the cosmetic outcome. No guarantee or warranty was given or implied regarding longevity of results.
Detail Level: Simple
Bill Summary Price Listed Below, Or Bill Total Of Units X Price Per Unit?: Bill Summary Price Below
Periorbital Skin Units: 12.5
Summary Price $ (Use Numbers Only, No Text Please.): 315
Map Statment: See attached map for complete details
Additional Area 3 Location: gummy smile

## 2022-07-05 ENCOUNTER — APPOINTMENT (RX ONLY)
Dept: URBAN - METROPOLITAN AREA CLINIC 12 | Facility: CLINIC | Age: 32
Setting detail: DERMATOLOGY
End: 2022-07-05

## 2022-07-05 DIAGNOSIS — Z41.9 ENCOUNTER FOR PROCEDURE FOR PURPOSES OTHER THAN REMEDYING HEALTH STATE, UNSPECIFIED: ICD-10-CM

## 2022-07-05 PROCEDURE — ? BOTOX

## 2022-07-05 PROCEDURE — ? CONSULTATION - FILLERS

## 2022-07-05 NOTE — PROCEDURE: CONSULTATION - FILLERS
Tear Troughs Secondary Filler Volume In Cc (Estimated): 0
Additional Area 4 Location: oral commissures
Detail Level: Detailed
Lips Filler (Primary): Amy Roldan
Upper Lips Primary Filler Volume In Cc (Estimated): 0.5
Additional Area 3 Location: buttocks
Additional Area 2 Location: pyriformis
Procedure Quote $ (Will Render In Note. Use Numbers Only, No Text Please.): 395
Send Procedure Quote As Charge: No
Additional Area 1 Location: chin
Additional Area 5 Location: dorsal hands

## 2022-07-05 NOTE — PROCEDURE: BOTOX
Use Map Statement For Sites (Optional): No
Additional Area 3 Location: platysma
Platsyma Units: 0
Additional Area 1 Units: 5
Summary Price $ (Use Numbers Only, No Text Please.): 770
Administered By (Optional): BEA Link
Bill Summary Price Listed Below, Or Bill Total Of Units X Price Per Unit?: Bill Summary Price Below
Show Price In Note?: yes
Detail Level: Simple
Periorbital Skin Units: 12.5
Postcare Instructions: Patient instructed to not lie down for 4 hours and limit physical activity for 24 hours. Patient instructed not to travel by airplane for 48 hours.
Additional Area 2 Units: 7.5
Additional Area 2 Location: gummy smile
Consent: Written consent was obtained prior to the procedure. Risks, benefits, expectations and alternatives were discussed including, but not limited to, infection, bleeding, lid/brow ptosis, bruising, swelling, diplopia, temporary effects, incomplete chemical denervation and dissatisfaction with the cosmetic outcome. No guarantee or warranty was given or implied regarding longevity of results.
Glabellar Complex Units: 20
Expiration Date (Month Year): 10/23
Forehead Units: 10
Map Statment: See attached map for complete details
Lot #: [id number]
Additional Area 1 Location: chin
Price Per Unit In $ (Use Numbers Only, No Text Please.): 15

## 2022-11-11 ENCOUNTER — APPOINTMENT (RX ONLY)
Dept: URBAN - METROPOLITAN AREA CLINIC 12 | Facility: CLINIC | Age: 32
Setting detail: DERMATOLOGY
End: 2022-11-11

## 2022-11-11 DIAGNOSIS — Z41.9 ENCOUNTER FOR PROCEDURE FOR PURPOSES OTHER THAN REMEDYING HEALTH STATE, UNSPECIFIED: ICD-10-CM

## 2022-11-11 PROCEDURE — ? BOTOX

## 2022-11-11 PROCEDURE — ? JUVEDERM ULTRA INJECTION

## 2022-11-11 NOTE — PROCEDURE: JUVEDERM ULTRA INJECTION
Number Of Syringes (Required For Inventory): 1
Temple Hollows Filler Volume In Cc: 0
Map Statement: See Attach Map for Complete Details
Price (Use Numbers Only, No Special Characters Or $): 395
Anesthesia Volume In Cc: 0.5
Procedural Text: The filler was administered to the treatment areas noted above.
Lot #: [id number]
Detail Level: Detailed
Additional Area 2 Location: chin
Anesthesia Type: 1% lidocaine with epinephrine
Filler: Juvederm Ultra
Use Map Statement For Sites (Optional): No
Consent: Written consent obtained. Risks include but not limited to bruising, beading, irregular texture, ulceration, infection, allergic reaction, scar formation, incomplete augmentation, temporary nature, procedural pain.
Additional Area 2 Volume In Cc: 0.1
Additional Anesthesia Volume In Cc: 6
Expiration Date (Month Year): 7/24
Post-Care Instructions: Patient instructed to apply ice to reduce swelling.
Vermilion Lips Filler Volume In Cc: 0.4
Additional Area 1 Location: perioral lines

## 2022-11-11 NOTE — PROCEDURE: BOTOX
Dilution (U/0.1 Cc): 5
Additional Area 2 Units: 0
Consent: Written consent was obtained prior to the procedure. Risks, benefits, expectations and alternatives were discussed including, but not limited to, infection, bleeding, lid/brow ptosis, bruising, swelling, diplopia, temporary effects, incomplete chemical denervation and dissatisfaction with the cosmetic outcome. No guarantee or warranty was given or implied regarding longevity of results.
Additional Area 1 Location: chin
Forehead Units: 2.5
Map Statment: See attached map for complete details
Expiration Date (Month Year): 8/24
Additional Area 3 Units: 7.5
Additional Area 2 Location: platysma
Use Map Statement For Sites (Optional): No
Additional Area 3 Location: gummy smile
Periorbital Skin Units: 10
Lot #: [id number]
Show Price In Note?: yes
Detail Level: Simple
Administered By (Optional): Jennifer Martinez PA-C
Bill Summary Price Listed Below, Or Bill Total Of Units X Price Per Unit?: Bill #Units x Price Per Unit
Postcare Instructions: Patient instructed to not lie down for 4 hours and limit physical activity for 24 hours. Patient instructed not to travel by airplane for 48 hours.
Price Per Unit In $ (Use Numbers Only, No Text Please.): 14

## 2022-12-28 ENCOUNTER — APPOINTMENT (RX ONLY)
Dept: URBAN - METROPOLITAN AREA CLINIC 12 | Facility: CLINIC | Age: 32
Setting detail: DERMATOLOGY
End: 2022-12-28

## 2022-12-28 ENCOUNTER — APPOINTMENT (RX ONLY)
Dept: URBAN - METROPOLITAN AREA OTHER 5 | Facility: OTHER | Age: 32
Setting detail: DERMATOLOGY
End: 2022-12-28

## 2022-12-28 DIAGNOSIS — D22 MELANOCYTIC NEVI: ICD-10-CM

## 2022-12-28 DIAGNOSIS — L90.5 SCAR CONDITIONS AND FIBROSIS OF SKIN: ICD-10-CM

## 2022-12-28 DIAGNOSIS — L57.8 OTHER SKIN CHANGES DUE TO CHRONIC EXPOSURE TO NONIONIZING RADIATION: ICD-10-CM

## 2022-12-28 DIAGNOSIS — Z41.9 ENCOUNTER FOR PROCEDURE FOR PURPOSES OTHER THAN REMEDYING HEALTH STATE, UNSPECIFIED: ICD-10-CM

## 2022-12-28 PROBLEM — D22.5 MELANOCYTIC NEVI OF TRUNK: Status: ACTIVE | Noted: 2022-12-28

## 2022-12-28 PROCEDURE — ? BOTOX

## 2022-12-28 PROCEDURE — ? COUNSELING

## 2022-12-28 PROCEDURE — ? JUVEDERM VOLUMA XC INJECTION

## 2022-12-28 PROCEDURE — 99212 OFFICE O/P EST SF 10 MIN: CPT

## 2022-12-28 PROCEDURE — ? ADDITIONAL NOTES

## 2022-12-28 ASSESSMENT — LOCATION SIMPLE DESCRIPTION DERM
LOCATION SIMPLE: LEFT FOREHEAD
LOCATION SIMPLE: LEFT UPPER BACK
LOCATION SIMPLE: RIGHT LOWER BACK

## 2022-12-28 ASSESSMENT — LOCATION ZONE DERM
LOCATION ZONE: FACE
LOCATION ZONE: TRUNK

## 2022-12-28 ASSESSMENT — LOCATION DETAILED DESCRIPTION DERM
LOCATION DETAILED: LEFT INFERIOR MEDIAL FOREHEAD
LOCATION DETAILED: LEFT SUPERIOR MEDIAL UPPER BACK
LOCATION DETAILED: RIGHT SUPERIOR MEDIAL MIDBACK

## 2022-12-28 NOTE — PROCEDURE: ADDITIONAL NOTES
Render Risk Assessment In Note?: no
[facility] [id number]

## 2022-12-28 NOTE — HPI: FULL BODY SKIN EXAMINATION
How Severe Are Your Spot(S)?: moderate
What Is The Reason For Today's Visit?: Full Body Skin Examination
What Is The Reason For Today's Visit? (Being Monitored For X): the re-examination of lesions previously examined
Additional History: recheck BCC site on back

## 2022-12-28 NOTE — PROCEDURE: BOTOX
Dilution (U/0.1 Cc): 5
Additional Area 2 Units: 0
Consent: Written consent was obtained prior to the procedure. Risks, benefits, expectations and alternatives were discussed including, but not limited to, infection, bleeding, lid/brow ptosis, bruising, swelling, diplopia, temporary effects, incomplete chemical denervation and dissatisfaction with the cosmetic outcome. No guarantee or warranty was given or implied regarding longevity of results.
Additional Area 1 Location: chin
Forehead Units: 7.5
Map Statment: See attached map for complete details
Expiration Date (Month Year): 2/25
Additional Area 2 Location: platysma
Use Map Statement For Sites (Optional): No
Additional Area 3 Location: gummy smile
Periorbital Skin Units: 10
Lot #: [id number]
Show Price In Note?: yes
Detail Level: Simple
Administered By (Optional): Jennifer Martinez PA-C
Bill Summary Price Listed Below, Or Bill Total Of Units X Price Per Unit?: Bill #Units x Price Per Unit
Postcare Instructions: Patient instructed to not lie down for 4 hours and limit physical activity for 24 hours. Patient instructed not to travel by airplane for 48 hours.
Glabellar Complex Units: 17.5
Price Per Unit In $ (Use Numbers Only, No Text Please.): 13
Additional Area 2 Location: masseters

## 2022-12-28 NOTE — PROCEDURE: JUVEDERM VOLUMA XC INJECTION
Detail Level: Detailed
Additional Area 1 Location: pyriformis
Temple Hollows Filler Volume In Cc: 0
Number Of Syringes (Required For Inventory): 1
Use Map Statement For Sites (Optional): No
Procedural Text: The filler was administered to the treatment areas noted above.
Anesthesia Volume In Cc: 0.5
Consent: Written consent obtained. Risks include but not limited to bruising, beading, irregular texture, ulceration, infection, allergic reaction, scar formation, incomplete augmentation, temporary nature, procedural pain.
Lot #: [id number]
Additional Area 2 Location: [place]ton
Post-Care Instructions: Patient instructed to apply ice to reduce swelling.
Filler: Juvederm Voluma XC
Anesthesia Type: 1% lidocaine with epinephrine
Price (Use Numbers Only, No Special Characters Or $): 170
Map Statment: See Attach Map for Complete Details
Additional Anesthesia Volume In Cc: 6
Expiration Date (Month Year): 11/23

## 2023-04-13 ENCOUNTER — APPOINTMENT (RX ONLY)
Dept: URBAN - METROPOLITAN AREA CLINIC 12 | Facility: CLINIC | Age: 33
Setting detail: DERMATOLOGY
End: 2023-04-13

## 2023-04-13 DIAGNOSIS — Z41.9 ENCOUNTER FOR PROCEDURE FOR PURPOSES OTHER THAN REMEDYING HEALTH STATE, UNSPECIFIED: ICD-10-CM

## 2023-04-13 PROCEDURE — ? BOTOX

## 2023-04-13 NOTE — PROCEDURE: BOTOX
Dilution (U/0.1 Cc): 5
Additional Area 2 Units: 0
Consent: Written consent was obtained prior to the procedure. Risks, benefits, expectations and alternatives were discussed including, but not limited to, infection, bleeding, lid/brow ptosis, bruising, swelling, diplopia, temporary effects, incomplete chemical denervation and dissatisfaction with the cosmetic outcome. No guarantee or warranty was given or implied regarding longevity of results.
Additional Area 1 Location: chin
Forehead Units: 7.5
Map Statment: See attached map for complete details
Expiration Date (Month Year): 2/25
Additional Area 2 Location: platysma
Use Map Statement For Sites (Optional): No
Additional Area 3 Location: gummy smile
Periorbital Skin Units: 10
Lot #: [id number]
Show Price In Note?: yes
Detail Level: Simple
Administered By (Optional): Jennifer Martinez PA-C
Bill Summary Price Listed Below, Or Bill Total Of Units X Price Per Unit?: Bill #Units x Price Per Unit
Postcare Instructions: Patient instructed to not lie down for 4 hours and limit physical activity for 24 hours. Patient instructed not to travel by airplane for 48 hours.
Glabellar Complex Units: 17.5
Price Per Unit In $ (Use Numbers Only, No Text Please.): 14

## 2023-04-17 ENCOUNTER — APPOINTMENT (RX ONLY)
Dept: URBAN - METROPOLITAN AREA CLINIC 12 | Facility: CLINIC | Age: 33
Setting detail: DERMATOLOGY
End: 2023-04-17

## 2023-04-17 DIAGNOSIS — Z41.9 ENCOUNTER FOR PROCEDURE FOR PURPOSES OTHER THAN REMEDYING HEALTH STATE, UNSPECIFIED: ICD-10-CM

## 2023-04-17 PROCEDURE — ? BOTOX

## 2023-04-17 PROCEDURE — ? RESTYLANE KYSSE INJECTION

## 2023-04-17 PROCEDURE — ? JUVEDERM VOLUMA XC INJECTION

## 2023-04-17 PROCEDURE — ? ADDITIONAL NOTES

## 2023-04-17 NOTE — PROCEDURE: ADDITIONAL NOTES
Render Risk Assessment In Note?: no
Additional Notes: Voluma 1cc [id number]\\nKYSSE 1cc [id number] 0.6 residual

## 2023-04-17 NOTE — PROCEDURE: RESTYLANE KYSSE INJECTION
Number Of Syringes (Required For Inventory): 1
Jawline Filler Volume In Cc: 0
Expiration Date (Month Year): 2/24
Include Cannula Information In Note?: No
Price (Use Numbers Only, No Special Characters Or $): 792
Anesthesia Volume In Cc: 0.5
Post-Care Instructions: Patient instructed to apply ice to reduce swelling.
Map Statement: See Attach Map for Complete Details
Lot #: [id number]
Filler: Restylane Kysse
Vermilion Lips Filler Volume In Cc: 0.4
Detail Level: Detailed
Procedural Text: The filler was administered to the treatment areas noted above.
Consent: Written consent obtained. Risks include but not limited to bruising, beading, irregular texture, ulceration, infection, allergic reaction, scar formation, incomplete augmentation, temporary nature, procedural pain.
Additional Anesthesia Volume In Cc: 6

## 2023-04-17 NOTE — PROCEDURE: JUVEDERM VOLUMA XC INJECTION
Price (Use Numbers Only, No Special Characters Or $): 844
Nasolabial Folds Filler Volume In Cc: 0
Additional Area 3 Location: chin
Map Statment: See Attach Map for Complete Details
Expiration Date (Month Year): 11/23
Additional Anesthesia Volume In Cc: 6
Additional Area 1 Location: pyriformis
Anesthesia Volume In Cc: 0.5
Number Of Syringes (Required For Inventory): 1
Procedural Text: The filler was administered to the treatment areas noted above.
Detail Level: Detailed
Include Cannula Information In Note?: No
Consent: Written consent obtained. Risks include but not limited to bruising, beading, irregular texture, ulceration, infection, allergic reaction, scar formation, incomplete augmentation, temporary nature, procedural pain.
Additional Area 1 Volume In Cc: 0.2
Lot #: [id number]
Post-Care Instructions: Patient instructed to apply ice to reduce swelling.
Jawline Filler Volume In Cc: 0.3
Anesthesia Type: 1% lidocaine with epinephrine
Filler: Juvederm Voluma XC

## 2023-04-17 NOTE — PROCEDURE: BOTOX
Dilution (U/0.1 Cc): 5
Additional Area 2 Units: 0
Consent: Written consent was obtained prior to the procedure. Risks, benefits, expectations and alternatives were discussed including, but not limited to, infection, bleeding, lid/brow ptosis, bruising, swelling, diplopia, temporary effects, incomplete chemical denervation and dissatisfaction with the cosmetic outcome. No guarantee or warranty was given or implied regarding longevity of results.
Additional Area 1 Location: chin
Forehead Units: 10
Map Statment: See attached map for complete details
Expiration Date (Month Year): 4/25
Additional Area 2 Location: platysma
Use Map Statement For Sites (Optional): No
Additional Area 3 Location: gummy smile
Lot #: [id number]
Show Price In Note?: yes
Detail Level: Simple
Administered By (Optional): Jennifer Martinez PA-C
Bill Summary Price Listed Below, Or Bill Total Of Units X Price Per Unit?: Bill #Units x Price Per Unit
Postcare Instructions: Patient instructed to not lie down for 4 hours and limit physical activity for 24 hours. Patient instructed not to travel by airplane for 48 hours.
Glabellar Complex Units: 20
Price Per Unit In $ (Use Numbers Only, No Text Please.): 14

## 2023-06-02 ENCOUNTER — APPOINTMENT (RX ONLY)
Dept: URBAN - METROPOLITAN AREA CLINIC 12 | Facility: CLINIC | Age: 33
Setting detail: DERMATOLOGY
End: 2023-06-02

## 2023-06-02 DIAGNOSIS — Z41.9 ENCOUNTER FOR PROCEDURE FOR PURPOSES OTHER THAN REMEDYING HEALTH STATE, UNSPECIFIED: ICD-10-CM

## 2023-06-02 PROCEDURE — ? BOTOX

## 2023-06-02 PROCEDURE — ? ADDITIONAL NOTES

## 2023-06-02 PROCEDURE — ? RESTYLANE KYSSE INJECTION

## 2023-06-02 NOTE — PROCEDURE: RESTYLANE KYSSE INJECTION
Post-Care Instructions: Patient instructed to apply ice to reduce swelling.
Decollete Filler Volume In Cc: 0
Vermilion Lips Filler Volume In Cc: 0.2
Filler: Restylane Kysse
Expiration Date (Month Year): 4/24
Additional Anesthesia Volume In Cc: 6
Map Statement: See Attach Map for Complete Details
Procedural Text: The filler was administered to the treatment areas noted above.
Number Of Syringes (Required For Inventory): 1
Anesthesia Volume In Cc: 0.5
Include Cannula Information In Note?: No
Consent: Written consent obtained. Risks include but not limited to bruising, beading, irregular texture, ulceration, infection, allergic reaction, scar formation, incomplete augmentation, temporary nature, procedural pain.
Lot #: [id number]
Detail Level: Detailed

## 2023-06-02 NOTE — PROCEDURE: BOTOX
Additional Area 3 Units: 0
Dilution (U/0.1 Cc): 5
Use Map Statement For Sites (Optional): No
Consent: Written consent was obtained prior to the procedure. Risks, benefits, expectations and alternatives were discussed including, but not limited to, infection, bleeding, lid/brow ptosis, bruising, swelling, diplopia, temporary effects, incomplete chemical denervation and dissatisfaction with the cosmetic outcome. No guarantee or warranty was given or implied regarding longevity of results.
Additional Area 1 Location: chin
Forehead Units: 15
Expiration Date (Month Year): 4/25
Map Statment: See attached map for complete details
Additional Area 2 Location: jelly roll
Detail Level: Simple
Additional Area 3 Location: perioral
Lot #: [id number]
Show Price In Note?: yes
Periorbital Skin Units: 10
Reconstitution Date: 06/02/2023
Administered By (Optional): Jennifer Martinez PA-C
Postcare Instructions: Patient instructed to not lie down for 4 hours and limit physical activity for 24 hours. Patient instructed not to travel by airplane for 48 hours.
Bill Summary Price Listed Below, Or Bill Total Of Units X Price Per Unit?: Bill #Units x Price Per Unit
Price Per Unit In $ (Use Numbers Only, No Text Please.): 12

## 2023-09-15 ENCOUNTER — APPOINTMENT (RX ONLY)
Dept: URBAN - METROPOLITAN AREA CLINIC 12 | Facility: CLINIC | Age: 33
Setting detail: DERMATOLOGY
End: 2023-09-15

## 2023-09-15 DIAGNOSIS — Z41.9 ENCOUNTER FOR PROCEDURE FOR PURPOSES OTHER THAN REMEDYING HEALTH STATE, UNSPECIFIED: ICD-10-CM

## 2023-09-15 PROCEDURE — ? RESTYLANE KYSSE INJECTION

## 2023-09-15 PROCEDURE — ? BOTOX

## 2023-09-15 NOTE — PROCEDURE: RESTYLANE KYSSE INJECTION
Post-Care Instructions: Patient instructed to apply ice to reduce swelling.
Decollete Filler Volume In Cc: 0
Vermilion Lips Filler Volume In Cc: 0.1
Filler: Restylane Kysse
Expiration Date (Month Year): 4/24
Additional Anesthesia Volume In Cc: 6
Map Statement: See Attach Map for Complete Details
Procedural Text: The filler was administered to the treatment areas noted above.
Number Of Syringes (Required For Inventory): 1
Anesthesia Volume In Cc: 0.5
Include Cannula Information In Note?: No
Consent: Written consent obtained. Risks include but not limited to bruising, beading, irregular texture, ulceration, infection, allergic reaction, scar formation, incomplete augmentation, temporary nature, procedural pain.
Lot #: [id number]
Detail Level: Detailed

## 2023-09-15 NOTE — PROCEDURE: BOTOX
Additional Area 3 Units: 5
Additional Area 2 Units: 0
Use Map Statement For Sites (Optional): No
Consent: Written consent was obtained prior to the procedure. Risks, benefits, expectations and alternatives were discussed including, but not limited to, infection, bleeding, lid/brow ptosis, bruising, swelling, diplopia, temporary effects, incomplete chemical denervation and dissatisfaction with the cosmetic outcome. No guarantee or warranty was given or implied regarding longevity of results.
Additional Area 1 Location: chin
Forehead Units: 7.5
Expiration Date (Month Year): 11/25
Map Statment: See attached map for complete details
Additional Area 2 Location: jelly roll
Detail Level: Simple
Additional Area 3 Location: gummy smile
Lot #: [id number]
Show Price In Note?: yes
Reconstitution Date: 06/02/2023
Platsyma Units: 15
Administered By (Optional): Jennifer Martinez PA-C
Postcare Instructions: Patient instructed to not lie down for 4 hours and limit physical activity for 24 hours. Patient instructed not to travel by airplane for 48 hours.
Bill Summary Price Listed Below, Or Bill Total Of Units X Price Per Unit?: Bill #Units x Price Per Unit
Price Per Unit In $ (Use Numbers Only, No Text Please.): 14